# Patient Record
Sex: MALE | Race: WHITE
[De-identification: names, ages, dates, MRNs, and addresses within clinical notes are randomized per-mention and may not be internally consistent; named-entity substitution may affect disease eponyms.]

---

## 2018-03-13 ENCOUNTER — HOSPITAL ENCOUNTER (INPATIENT)
Dept: HOSPITAL 59 - MEDSURG | Age: 78
LOS: 1 days | Discharge: HOME HEALTH | DRG: 470 | End: 2018-03-14
Attending: ORTHOPAEDIC SURGERY | Admitting: ORTHOPAEDIC SURGERY
Payer: MEDICARE

## 2018-03-13 DIAGNOSIS — E11.9: ICD-10-CM

## 2018-03-13 DIAGNOSIS — Z79.84: ICD-10-CM

## 2018-03-13 DIAGNOSIS — I10: ICD-10-CM

## 2018-03-13 DIAGNOSIS — E78.00: ICD-10-CM

## 2018-03-13 DIAGNOSIS — M17.12: Primary | ICD-10-CM

## 2018-03-13 LAB
ABO GROUP: (no result)
ANTIBODY SCREEN: NEGATIVE
RH TYPE: POSITIVE

## 2018-03-13 PROCEDURE — 36416 COLLJ CAPILLARY BLOOD SPEC: CPT

## 2018-03-13 PROCEDURE — 0SRD069 REPLACEMENT OF LEFT KNEE JOINT WITH OXIDIZED ZIRCONIUM ON POLYETHYLENE SYNTHETIC SUBSTITUTE, CEMENTED, OPEN APPROACH: ICD-10-PCS | Performed by: ORTHOPAEDIC SURGERY

## 2018-03-13 PROCEDURE — 85018 HEMOGLOBIN: CPT

## 2018-03-13 PROCEDURE — 97165 OT EVAL LOW COMPLEX 30 MIN: CPT

## 2018-03-13 PROCEDURE — 85014 HEMATOCRIT: CPT

## 2018-03-13 PROCEDURE — 86901 BLOOD TYPING SEROLOGIC RH(D): CPT

## 2018-03-13 PROCEDURE — 97116 GAIT TRAINING THERAPY: CPT

## 2018-03-13 PROCEDURE — 82948 REAGENT STRIP/BLOOD GLUCOSE: CPT

## 2018-03-13 PROCEDURE — 86850 RBC ANTIBODY SCREEN: CPT

## 2018-03-13 PROCEDURE — 80048 BASIC METABOLIC PNL TOTAL CA: CPT

## 2018-03-13 PROCEDURE — 97530 THERAPEUTIC ACTIVITIES: CPT

## 2018-03-13 PROCEDURE — 86900 BLOOD TYPING SEROLOGIC ABO: CPT

## 2018-03-13 RX ADMIN — CEFAZOLIN SODIUM ONE: 2 SOLUTION INTRAVENOUS at 18:44

## 2018-03-13 RX ADMIN — CEFAZOLIN SODIUM ONE MLS/HR: 2 SOLUTION INTRAVENOUS at 16:30

## 2018-03-13 RX ADMIN — INSULIN HUMAN SCH UNIT: 100 INJECTION, SOLUTION PARENTERAL at 18:37

## 2018-03-13 RX ADMIN — DEXTROSE AND SODIUM CHLORIDE SCH: 5; .9 INJECTION, SOLUTION INTRAVENOUS at 22:01

## 2018-03-13 RX ADMIN — INSULIN HUMAN SCH: 100 INJECTION, SOLUTION PARENTERAL at 18:40

## 2018-03-13 RX ADMIN — CEFAZOLIN SODIUM SCH MLS/HR: 2 SOLUTION INTRAVENOUS at 16:37

## 2018-03-13 RX ADMIN — MAGNESIUM OXIDE TAB 400 MG (241.3 MG ELEMENTAL MG) SCH MG: 400 (241.3 MG) TAB at 21:22

## 2018-03-13 RX ADMIN — DOCUSATE SODIUM SCH: 100 TABLET, FILM COATED ORAL at 21:21

## 2018-03-13 RX ADMIN — TIMOLOL MALEATE SCH DROP: 5 SOLUTION OPHTHALMIC at 16:36

## 2018-03-13 RX ADMIN — Medication SCH TAB: at 21:22

## 2018-03-13 RX ADMIN — DEXTROSE AND SODIUM CHLORIDE SCH MLS/HR: 5; .9 INJECTION, SOLUTION INTRAVENOUS at 20:09

## 2018-03-13 NOTE — REHAB EVALUATION
Patient Information





- Patient Information


Diagnosis: DJD L Knee


Ordered Treatment: PT Evaluate and Treat


Status: Initial Evaluation


Surgery: Yes


Date of Surgery: 03/13/18 (L TKA)


Past Medical/Surgical Hx: 


 PAST MEDICAL/SURGICAL HISTORY





Past Surgical History            RTKA


                                 bilat CTR


                                 c scope


                                 EGD


                                 vagotomy


                                 2011 heart stent


                                 2012 cervical surgery


                                 previous cervical sx


                                 2013 lumbar sx


                                 2014 hernia repair


                                 





PMH - Respiratory





Hx Respiratory Disorders         Yes


Hx Pneumonia                     Yes





PMH - Cardiovascular





Hx Cardiovascular Disorders      Yes


Hx Abnormal EKG                  Yes: ABN pre op EKG 3-1-18 going for stress 

test


                                 3-8-18


Hx Cardiac Catheterization       Yes


Hx Hypertension                  Yes: on meds good control


Hx Coronary Artery Disease       Yes


Hx Coronary Stent                Yes: 2011


Hx Rheumatic Fever               Yes: as a child


Exercise Tolerance               Good





PMH - Neuro





Hx Neurological Disorders        No





PMH - GI





Hx Gastrointestinal Disorders    Yes


Hx Ulcer                         Yes: 1970's





PMH - 





Hx Genitourinary Disorders       No





PMH - Endocrine





Hx Endocrine Disorders           Yes


Hx Diabetes                      Yes: dx'd 2 yrs ago


Hx of NIDDM                      Yes: on metformin


Comment:                         A1C 6.1 -112





PMH - Musculoskeletal





Hx Musculoskeletal Disorders     Yes


Hx Arthritis                     Yes


Hx Back Injury                   Yes





PMH - Psych





Hx Psychiatric Problems          No





PMH - Hematology/Oncology





Hx Hematology/Oncology           Yes


Disorders                        


Hx Anemia                        Yes


Hx Bruising                      Yes: bruises easily


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








Social History: Detail (The patient lives in a one story home with his spouse. 

The home has 2 steps to enter with a hand rail on the left. The patient has a 

walk-in shower with a shower bench, grab bars, and hand-held shower head. The 

patient has an elevated toilet seat, but no grab bars. The patient will be 

using a standard walker for ambulation.)


Precautions: Other (WBAT on L.)





- Time With Patient


Total Time Spent With Patient (Min): 30


Treatment Procedures: Detail (PT Initial Evaluation)





Subjective Information





- Subjective Information


Per Patient (The patient had no complaints of dizziness or nausea at initial 

evaluation.)





Objective Data





- Pain


Pain Present: No


Pain Intensity: 0


Pain Scale Used: Numeric (1 - 10)





- Mental Status


Patient Orientation: Oriented x3





- ROM


Within normal limits (The patient was limited in L knee ROM as expected s/p L 

TKA. Other joints were not formally assessed, but were within functional limits 

for evaluation activities.)





- Strength/Tone


Within normal limits (The patient's strength was not formally assessed, but the 

patient did have limits in L knee strength as expected s/p L TKA. He was within 

functional limits at other joints for evaluation activities.)





- Bed Mobility


Independent (The patient was independent with supine to sit and sit to supine. 

He was independent with scooting to the middle of the bed.)





- Transfers


Independent (The patient was able to transfer from sit to stand and stand to 

sit independently.)





- Balance


Balance Sitting: Good (No LOB with sitting at the bedside)


Balance Standing: Good (The patient showed no LOB with static standing at the 

bedside. The patient showed no LOB with gait activities.)





- Gait


Detail (The patient was able to ambulate from his bedside to room 28 and back 

to his room (about 80 feet total). He required supervision only, and used a 2WW 

and WBAT on L.)





Therapy Assessment





- Therapy Assessment


Detail (The patient showed independence with transfers and bed mobility. He 

required supervision for gait activities, but ability to ascend/descend stairs 

was not assessed. The patient should progress well with inpatient PT.)





Patient Education





- Patient Education


Teaching Topic: Exercise/Activity (HEP was reviewed, which included: Quad sets, 

hamstring sets, glute. sets, ankle pumps, and SLR. He was instructed to 

complete 10 reps 1-2x/day,)


Response: Return Demonstration, Verbalize Understanding


Teaching Method: Discussion, Demonstration


Teaching Recipient: Patient


Barriers To Learning: None





Problem List





- Problem List


Physical Therapy Problem List: Detail (1) Decreased ROM and strength in L knee 

as expected s/p L TKA 2) Ability to ascend/descend stairs was not assessed)





Goals





- Goals


Physical Therapy Goals: 1) The patient will be able to ascend and descend 3 

stairs with supervision to safely enter/exit the home.  2) The patient will be 

independent in HEP to increase ROM and strength in L knee to improve function 

in the home.





Prognosis





- Prognosis


Good





Plan





- Plan


Physical Therapy Plan: The patient will be seen 1-2x/day M-F for gait training 

and LE strengthening activities.

## 2018-03-13 NOTE — OPERATIVE NOTE
DATE:  03/13/18



PREOPERATIVE DIAGNOSIS:  END-STAGE ARTHROSIS OF THE LEFT KNEE.



POSTOPERATIVE DIAGNOSIS:  END-STAGE ARTHROSIS OF THE LEFT KNEE.



PROCEDURE:  Cemented left total knee arthroplasty using [employer] 
Shweta II components with a size 7 Cobalt Chrome femur, size 7 stemmed tibial 
base plate, a 9 mm lipped tibial insert, and a 35 mm all-plastic patella.



STAFF SURGEON:  GILLES DRUMMOND M.D.



ANESTHESIA:  SPINAL.



PREPARATION:  CHLORAPREP.



INDIVIDUAL CONSIDERATIONS: NONE. 



PROCEDURE:  The patient was taken to the Operating Room and placed supine on 
the operating table. He had a successful induction of a spinal anesthetic. His 
left lower extremity was prepped and draped in the usual fashion.



The patient had a midline approach to the knee. The limb was elevated and the 
tourniquet was inflated to 250 mmHg. Sharp dissection was carried down through 
the skin and subcutaneous tissue. Small veins were coagulated with a Bovie. A 
medial arthrotomy was performed. The patella was everted and the knee was 
flexed. The patient had exposed bone in some of the patellofemoral compartments 
especially in the notch. The ACL was sacrificed, capsule was released from the 
medial proximal tibia, and provisional anterior meniscectomies were performed. 



The initial femoral  hole was then made freehand. The intramedullary 
femoral cutting jig was placed. It was cut in 7 degrees of valgus and adjusted 
for rotation and secured with pins. The initial transverse cut was then made 
and it was set for a 10 mm resection. The skin guide was placed in the anterior 
and posterior  holes and it was found that a size 7 would be appropriate. 
The anterior and posterior cuts followed by chamfer cuts were made, osteophytes 
removed, and a size 7 trial was placed and found to fit well.  



The tibia was brought forward and the remainder of the meniscal remnants were 
removed with a Bovie. The extra-articular tibial cutting jig was placed and it 
was cut neutral with a 3 degree AP slope. Care was taken to adjust for rotation 
and flexion using the cutting blocks and was set for a 9 mm resection and keyed 
off the high lateral side. When cutting the tibia, care was taken to preserve 
the PCL insertion on the tibia. Osteophytes were removed and I found that I 
could easily fit a size 7 baseplate trial. This was adjusted for rotation and 
secured with pins. With a 9 mm trial and a femoral trial, there was excellent 
motion and stability. The femoral  holes were impacted and a triflange 
tibial stamp was impacted, and these trial components were removed.



The patient had a thick patella and I was able to easily remove roughly 9 mm of 
bone with an oscillating saw freehand. It would easily fit a 35 patella and the 
three  holes were then drilled. The tourniquet was let down briefly to get 
bleeders posteriorly then placed back up again. The knee was then thoroughly 
irrigated with pulsatile Betadine and saline to remove any visual or palpable 
debris. Bony surfaces were then dried. A size 7 stem tibial baseplate was 
cemented into place, followed by impaction of an 9 mm lipped tibial insert, 
followed by cementing in the size 7 Cobalt Chrome femur, followed by cementing 
in the 35 mm patella. Implant surfaces were compressed, excess cement was 
removed, and after the cement had set, there was excellent motion and 
stability. Ligamentous balance, rotation alignment, and patellofemoral tracking 
were normal. No lateral release was required.  Tourniquet was let down. 
Hemostasis was obtained with a Bovie. Again, thorough irrigation. The skin and 
periosteum were then infiltrated with 30 mL of 0.50% Marcaine with Epinephrine. 
The capsule was then closed with a running #2 Quill, the subcutaneous was 
closed in layers with running 0 Quill, and the skin was closed with staples. 30 
mL of saline was mixed with a gram of Tranexamic Acid and this was injected 
into the knee through a sterile 18-gauge needle and a sterile Bulkee 
compressive Aquacel-type dressing was applied. The patient tolerated the 
procedure well. Needle and sponge counts were correct. Estimated blood loss was 
minimal and he was taken back to Recovery in good condition. There were no 
complications. 





cc: Dr. Yola Vasquez

JOB NUMBER: [id number]
MTDD

## 2018-03-14 LAB
ANION GAP SERPL CALC-SCNC: 9 MMOL/L (ref 7–16)
BUN SERPL-MCNC: 13 MG/DL (ref 8–23)
CO2 SERPL-SCNC: 28 MMOL/L (ref 22–29)
CREAT SERPL-MCNC: 0.8 MG/DL (ref 0.7–1.2)
EST GLOMERULAR FILTRATION RATE: > 60 ML/MIN
GLUCOSE SERPL-MCNC: 172 MG/DL (ref 74–109)
HCT VFR BLD CALC: 34.7 % (ref 42–52)
HGB BLD-MCNC: 11.5 GM/DL (ref 14–18)

## 2018-03-14 RX ADMIN — INSULIN HUMAN SCH: 100 INJECTION, SOLUTION PARENTERAL at 10:24

## 2018-03-14 RX ADMIN — DEXTROSE AND SODIUM CHLORIDE SCH MLS/HR: 5; .9 INJECTION, SOLUTION INTRAVENOUS at 04:26

## 2018-03-14 RX ADMIN — ACETAMINOPHEN AND CODEINE PHOSPHATE PRN UDTAB: 300; 60 TABLET ORAL at 07:57

## 2018-03-14 RX ADMIN — CEFAZOLIN SODIUM SCH MLS/HR: 2 SOLUTION INTRAVENOUS at 00:41

## 2018-03-14 RX ADMIN — CEFAZOLIN SODIUM SCH MLS/HR: 2 SOLUTION INTRAVENOUS at 09:45

## 2018-03-14 RX ADMIN — DOCUSATE SODIUM SCH MG: 100 TABLET, FILM COATED ORAL at 10:19

## 2018-03-14 RX ADMIN — TIMOLOL MALEATE SCH DROP: 5 SOLUTION OPHTHALMIC at 10:22

## 2018-03-14 RX ADMIN — MAGNESIUM OXIDE TAB 400 MG (241.3 MG ELEMENTAL MG) SCH MG: 400 (241.3 MG) TAB at 10:19

## 2018-03-14 RX ADMIN — ACETAMINOPHEN AND CODEINE PHOSPHATE PRN UDTAB: 300; 60 TABLET ORAL at 12:58

## 2018-03-14 RX ADMIN — Medication SCH TAB: at 10:19

## 2018-03-14 NOTE — REHAB EVALUATION
Patient Information





- Patient Information


Diagnosis: DJD L Knee


Ordered Treatment: OT Evaluate and Treat


Status: Initial Evaluation


Surgery: Yes


Date of Surgery: 03/13/18 (L TKA)


Past Medical/Surgical Hx: 


 PAST MEDICAL/SURGICAL HISTORY





Past Surgical History            RTKA


                                 bilat [facility]


                                 c scope


                                 EGD


                                 vagotomy


                                 2011 heart stent


                                 2012 cervical surgery


                                 previous cervical sx


                                 2013 lumbar sx


                                 2014 hernia repair


                                 





PMH - Respiratory





Hx Respiratory Disorders         Yes


Hx Pneumonia                     Yes





PMH - Cardiovascular





Hx Cardiovascular Disorders      Yes


Hx Abnormal EKG                  Yes: ABN pre op EKG 3-1-18 going for stress 

test


                                 3-8-18


Hx Cardiac Catheterization       Yes


Hx Hypertension                  Yes: on meds good control


Hx Coronary Artery Disease       Yes


Hx Coronary Stent                Yes: 2011


Hx Rheumatic Fever               Yes: as a child


Exercise Tolerance               Good





PMH - Neuro





Hx Neurological Disorders        No





PMH - GI





Hx Gastrointestinal Disorders    Yes


Hx Ulcer                         Yes: 1970's





PMH - 





Hx Genitourinary Disorders       No





PMH - Endocrine





Hx Endocrine Disorders           Yes


Hx Diabetes                      Yes: dx'd 2 yrs ago


Hx of NIDDM                      Yes: on metformin


Comment:                         A1C 6.1 -112





PMH - Musculoskeletal





Hx Musculoskeletal Disorders     Yes


Hx Arthritis                     Yes


Hx Back Injury                   Yes





PMH - Psych





Hx Psychiatric Problems          No





PMH - Hematology/Oncology





Hx Hematology/Oncology           Yes


Disorders                        


Hx Anemia                        Yes


Hx Bruising                      Yes: bruises easily


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








Premorbid Status: Detail (Pt. reported he was Ind. with all I/ADL's prior to sx.

)


Social History: Detail (The patient lives in a one story home with his spouse. 

The home has 2 steps to enter with a hand rail on the left. The patient has a 

walk-in shower with a shower bench, grab bars, and hand-held shower head. The 

patient has an elevated toilet seat, but no grab bars. The patient will be 

using a standard walker for ambulation. Pt. reported his wife has 6 days off of 

work and will be available to assist as needed.)


Precautions: Other (WBAT on L.)





- Time With Patient


Total Time Spent With Patient (Min): 20





Objective Data





- Pain


Pain Present: Yes (Pt. reported minimal pain (2/10) in LLE.)





- Mental Status


Patient Orientation: Oriented x3





- Visual Perception


Appears within normal limits for therapeutic activities





- ROM


Within normal limits (BUE)





- Strength/Tone


Within normal limits (BUE 5/5 MMT)





- Coordination


Appears within normal limits for therapeutic activities





- Transfers


Independent (sit<>stand from chair to walker)





- Balance


Balance Sitting: Good


Balance Standing: Fair





- Sensation


Intact (BUE light touch intact fingertips)





- ADL's/IADL's


Detail (Pt. stated he has a reacher, sock aid, and long handled shoe horn at 

home but hasn't used the reacher in a while. He was familiar with adaptive 

dressing techniques and use of AE from previous sx of R knee replacement and hx 

of neck and back injuries. Techniques and AE were still reviewed. Pt. used 

reacher to don elastic waist pants independently.)





Therapy Assessment





- Therapy Assessment


Detail (In-pt. OT services not recommended at this time. Pt. has a positive 

support system and assistance if needed during recovery. Pt. has AE and a good 

set-up in his home environment, and demo. awareness of adaptive techniques. Pt. 

stated no questions or concerns at this time.)





Patient Education





- Patient Education


Teaching Topic: Equipment Use


Response: Return Demonstration, Verbalize Understanding


Teaching Method: Discussion, Demonstration


Teaching Recipient: Patient


Barriers To Learning: None





Problem List





- Problem List


Physical Therapy Problem List: Detail (1) Decreased ROM and strength in L knee 

as expected s/p L TKA 2) Ability to ascend/descend stairs was not assessed)





Goals





- Goals


Physical Therapy Goals: 1) The patient will be able to ascend and descend 3 

stairs with supervision to safely enter/exit the home.  2) The patient will be 

independent in HEP to increase ROM and strength in L knee to improve function 

in the home.





Prognosis





- Prognosis


Good





Plan





- Plan


Physical Therapy Plan: The patient will be seen 1-2x/day M-F for gait training 

and LE strengthening activities.


Occupational Therapy Plan: D/C from OT services. Educ. was provided to call if 

has Q's when he arrives home.

## 2018-03-14 NOTE — PHYSICAL THERAPY TX NOTE
Physical Therapy Tx Note





- Treatment Note


Tolerated: Good (Pt. reported 2/10 knee pain from start to finish of tx. Pt. 

denied SOB/ nausea, and fatigue. Pt. reported good understanding of HEP and 

precautions, and has been through TKA on the contralateral side. The pt.'s 

walker was adjusted to his proper height and he demonstrated good understanding 

of gait mechanics once cued to advance the walker appropriately and slow down 

roger.)


Total Time Spent With Patient: 30


Physical Therapy Tx Note: Detail (Pt. was seen for PT tx that consisted of gait 

training for proper walker advancement and stair use. Pt. independently 

ambulated 80 feet with standard walker and independently ascended and descended 

three steps safely. Pt. received instruction for stair use to lift the 

appropriate LE, and verbalized good understanding. Pt. verbalized understanding 

of infection signs and precautions. Pt. received therapeutic activity for HEP 

performance and positioning of his leg while seated. Pt. was left seated with 

call light available, Cryo LLE, and nursing was notified of pt.'s status.)


Physical Therapy Problem List: Detail (Pt. was seen for stairs and exhibits no 

PT problems that would warrant further inpatient care.)


Physical Therapy Goals: Pt. has met all goals set for inpatient PT per initial 

evaluation.


Prognosis: Good (Pt. has met all inpatient PT goals and is appropriate for D/C 

to home environment.)


Physical Therapy Plan: D/C pt. from inpatient PT.

## 2018-03-15 NOTE — DISCHARGE SUMMARY
DATE OF ADMISSION: 03/13/18



DATE OF DISCHARGE: 03/14/18



DATE OF SURGERY: 03/13/18



HISTORY: Mr. Lam is a delightful 77-year-old male who presents with end-stage 
arthrosis of his left knee. He was admitted after left total knee arthroplasty. 
Postoperatively, he did well. His hospital course was unremarkable. He was 
independent on the evening of surgery. 



DISCHARGE INSTRUCTIONS:  The plan is to discharge him home in the care of his 
family. Home PT and Visiting Nurse were arranged. Discharge hemoglobin was 11.5 
and did not require transfusion. The sutures will be removed by the Visiting 
Nurse in two weeks. He will follow-up in my office in four weeks. He will be 
maintained on the Xarelto dose for five days and then he will continue with his 
daily Aspirin. Follow-up in four weeks. 



FINAL DIAGNOSIS/PRIMARY DIAGNOSIS:  END-STAGE ARTHROSIS OF THE LEFT KNEE. 

SECONDARY DIAGNOSES:   OPERATIVE BLOOD LOSS ANEMIA. 



OPERATIONS AND PROCEDURES:  CEMENTED LEFT TOTAL KNEE ARTHROPLASTY. 



DISCHARGE CONDITION: GOOD. 





cc: Dr. Yola Vasquez 

JOB NUMBER: [id number]
MTDD